# Patient Record
Sex: FEMALE | Race: WHITE
[De-identification: names, ages, dates, MRNs, and addresses within clinical notes are randomized per-mention and may not be internally consistent; named-entity substitution may affect disease eponyms.]

---

## 2018-04-20 ENCOUNTER — HOSPITAL ENCOUNTER (INPATIENT)
Dept: HOSPITAL 26 - MED | Age: 23
LOS: 1 days | Discharge: LEFT BEFORE DISCHARGE | DRG: 872 | End: 2018-04-21
Payer: COMMERCIAL

## 2018-04-20 VITALS — HEIGHT: 65 IN | WEIGHT: 123 LBS | BODY MASS INDEX: 20.49 KG/M2

## 2018-04-20 DIAGNOSIS — N12: ICD-10-CM

## 2018-04-20 DIAGNOSIS — I10: ICD-10-CM

## 2018-04-20 DIAGNOSIS — Z53.21: ICD-10-CM

## 2018-04-20 DIAGNOSIS — Z90.49: ICD-10-CM

## 2018-04-20 DIAGNOSIS — K59.00: ICD-10-CM

## 2018-04-20 DIAGNOSIS — A41.9: Primary | ICD-10-CM

## 2018-04-20 DIAGNOSIS — Z87.891: ICD-10-CM

## 2018-04-20 DIAGNOSIS — Z88.5: ICD-10-CM

## 2018-04-21 VITALS — SYSTOLIC BLOOD PRESSURE: 116 MMHG | DIASTOLIC BLOOD PRESSURE: 65 MMHG

## 2018-04-21 VITALS — SYSTOLIC BLOOD PRESSURE: 112 MMHG | DIASTOLIC BLOOD PRESSURE: 69 MMHG

## 2018-04-21 VITALS — DIASTOLIC BLOOD PRESSURE: 68 MMHG | SYSTOLIC BLOOD PRESSURE: 116 MMHG

## 2018-04-21 LAB
ALBUMIN FLD-MCNC: 3.6 G/DL (ref 3.4–5)
ANION GAP SERPL CALCULATED.3IONS-SCNC: 10.4 MMOL/L (ref 8–16)
APPEARANCE UR: (no result)
AST SERPL-CCNC: 15 U/L (ref 15–37)
BARBITURATES UR QL SCN: (no result) NG/ML
BENZODIAZ UR QL SCN: (no result) NG/ML
BILIRUB SERPL-MCNC: 0.4 MG/DL (ref 0–1)
BILIRUB UR QL STRIP: NEGATIVE
BUN SERPL-MCNC: 12 MG/DL (ref 7–18)
BZE UR QL SCN: (no result) NG/ML
CANNABINOIDS UR QL SCN: (no result) NG/ML
CHLORIDE SERPL-SCNC: 100 MMOL/L (ref 98–107)
CO2 SERPL-SCNC: 29.1 MMOL/L (ref 21–32)
COLOR UR: YELLOW
CREAT SERPL-MCNC: 0.7 MG/DL (ref 0.6–1.3)
EOSINOPHIL NFR BLD MANUAL: 1 % (ref 0–4)
ERYTHROCYTE [DISTWIDTH] IN BLOOD BY AUTOMATED COUNT: 13.3 % (ref 11.6–13.7)
GFR SERPL CREATININE-BSD FRML MDRD: 135 ML/MIN (ref 90–?)
GLUCOSE SERPL-MCNC: 101 MG/DL (ref 74–106)
GLUCOSE UR STRIP-MCNC: NEGATIVE MG/DL
HCT VFR BLD AUTO: 35.6 % (ref 36–48)
HGB BLD-MCNC: 11.8 G/DL (ref 12–16)
HGB UR QL STRIP: (no result)
LEUKOCYTE ESTERASE UR QL STRIP: (no result)
LYMPHOCYTES NFR BLD MANUAL: 13 % (ref 20–46)
MCH RBC QN AUTO: 31 PG (ref 27–31)
MCHC RBC AUTO-ENTMCNC: 33 G/DL (ref 33–37)
MCV RBC AUTO: 91.9 FL (ref 80–94)
MONOCYTES NFR BLD MANUAL: 6 % (ref 5–12)
NITRITE UR QL STRIP: POSITIVE
OPIATES UR QL SCN: (no result) NG/ML
PCP UR QL SCN: (no result) NG/ML
PH UR STRIP: 7.5 [PH] (ref 5–9)
PLATELET # BLD AUTO: 211 K/UL (ref 140–450)
POTASSIUM SERPL-SCNC: 3.5 MMOL/L (ref 3.5–5.1)
RBC # BLD AUTO: 3.87 MIL/UL (ref 4.2–5.4)
RBC #/AREA URNS HPF: (no result) /HPF (ref 0–5)
SODIUM SERPL-SCNC: 136 MMOL/L (ref 136–145)
WBC # BLD AUTO: 11 K/UL (ref 4.8–10.8)
WBC,URINE: (no result) /HPF (ref 0–5)

## 2018-04-21 NOTE — NUR
PT REQUESTING ALL IV ANTIBIOTICS BE GIVEN NOW, I EXPLAINED TO PT I CANNOT DO THAT AND IT IS 
NOT SAFE. PT STATES, "I DON'T UNDERSTAND WHY YOU CAN'T JUST GIVE ME ALL OF THEM, I DON'T 
WANT TO WAIT." I REINFORCED TEACHING TO PT, PT STATES SHE WILL TALK TO DR ABOUT BEING GIVEN 
ORAL ANTIBIOTICS.

## 2018-04-21 NOTE — NUR
DR WRIGHT IS AWARE THAT PATIENT WANTS TO GO AMA. HE SAID IT WAS UP TO HER TO LEAVE AGAINST 
AMA, AND SHE WAS EXPLAINED THE RISKS OF AMA.

## 2018-04-21 NOTE — NUR
TALKED WITH PT AND EXPLAINED RISKS OF LEAVING, EXPLAINED THE IMPORTANCE OF STAYING, PT 
AGREES TO STAY

## 2018-04-21 NOTE — NUR
PATIENT HAS BEEN SCREENED AND CATEGORIZED AS LOW RISK. PATIENT WILL BE SEEN WITHIN 7 DAYS OF 
ADMISSION. 

4/28/18

[address], [facility]

## 2018-04-21 NOTE — NUR
ROSENDO ASKED BOYFRIEND TO LEAVE BECAUSE THEIR IS A FEMALE ROOMMATE IN THE ROOM AND BOYFRIEND 
REFUSED TO ACKNOWLEDGE OR LOOK AT US

## 2018-04-21 NOTE — NUR
PT FOUND IN HALLWAY STATING SHE WANTS TO LEAVE AND SIGN OUT AMA, BOYFRIEND STATES SHE IS 
REALLY AGITATED AND DOES NOT WANT TO BE HERE, PT ADMITS TO DOING METH RECENTLY.

## 2018-04-21 NOTE — NUR
ADMINISTERED MORNING MED. PATIENT TOLERATED WELL. PATIENT COMPLAINS OF WANTING TO GO HOME. 
EXPLAINED THE RISKS OF GOING AMA. PATIENT VERBALIZED UNDERSTANDING. SHE WILL CALL ME USING 
THE CALL LIGHT IF SHE DECIDES TO GO AMA. WILL CONTINUE TO MONITOR THE PATIENT. BOYFRIEND AT 
BEDSIDE.

## 2018-04-21 NOTE — NUR
RECEIVED BEDSIDE REPORT FROM NIGHT SHIFT NURSE. PATIENT IS AWAKE, ALERT AND ORIENTEDX4. 
BOYFRIEND AT BEDSIDE. MS PATIENT. NO SIGNS OF DISTRESS ON ROOM AIR. STANDARD PRECAUTIONS. IV 
ON R AC 20G INFUSING D5%/NACL 0.9% AT 100ML/HR. IV SITE IS CLEAN, DRY AND INTACT. SKIN IS 
INTACT. PATIENT IS ABLE TO AMBULATE TO THE RESTROOM. PATIENT DX UTI. NO COMPLAINS OF PAIN AT 
THIS TIME. BED IN LOW POSITION. CALL LIGHT WITHIN REACH. WILL CONTINUE TO MONITOR THE 
PATIENT.

## 2018-04-21 NOTE — NUR
PT REQUESTS TO BE DISCONNECTED FROM IV TO USE THE BATHROOM AND STATES, "I DID NOT GET MY 
PYRIDIUM YET, WHERE IS IT?" EXPLAINED TO PT I GAVE IT TO HER ALREADY AND EXPLAINED 
MEDICATION AND SIDE EFFECTS, PT VERBALIZED UNDERSTANDING

## 2018-04-21 NOTE — NUR
PT STATING, "I DON'T UNDERSTAND WHY I HAVE TO BE HERE, I DON'T WANT TO BE HERE, WHY IS THE 
TV ON ITS SCARY, I DON'T UNDERSTAND THIS PLACE."

## 2018-04-21 NOTE — NUR
PATIENT PRESENTS TO ED WITH LEFT FLANK PAIN, SUPRAPUBIC PAIN, AND BURNING WITH 
URINATION 7/10, WITH FREQUENCY AND URGENCY X1 WK. PT IS FEBRILE, TEMP 100.4, 
AND TACKY  . PT STATES NAUESEA WITHOUT VOMITING; SKIN IS PINK/HOT/DRY; 
AAOX4 WITH EVEN AND STEADY GAIT; LUNGS CLEAR BL; HR EVEN AND REGULAR; PT DENIES 
ANY, CP, SOB, OR COUGH AT THIS TIME; PATIENT STATES PAIN OF 7/10 AT THIS TIME; 
VSS; PATIENT POSITIONED FOR COMFORT; COOLING MEASURES IMPLEMENTED; HOB 
ELEVATED; BEDRAILS UP X1; BED DOWN. ER MD MADE AWARE OF PT STATUS. CONTINUE TO 
MONITOR.

## 2018-04-21 NOTE — NUR
PT ARRIVED ON THE UNIT FROM ER IN STABLE CONDITION. REPORT RECEIVED FROM SUNNY IZAGUIRRE. NO S/S OF 
DISTRESS NOTED. PT AAOX4, ON ROOM AIR. IV TO R AC PATENT AND INTACT. SKIN INTACT, WARM AND 
DRY TO TOUCH. LUNGS CLEAR BILAT. BOWEL SOUNDS PRESENT. INITIAL ASSESSMENT COMPLETED, PLAN OF 
CARE DISCUSSED WITH PT, ALL SAFETY PRECAUTIONS MET, CALL LIGHT WITHIN REACH, WILL CONTINUE 
TO MONITOR

## 2018-04-21 NOTE — NUR
MICHAEL WRIGHT FOR ORDERS FOR PYRIDIUM, WILL CARRY OUT NEW ORDERS

-------------------------------------------------------------------------------

Addendum: 04/21/18 at 0559 by Shantelle Carney RN

-------------------------------------------------------------------------------

TIME CORRECTION FOR 0400 AM

## 2018-04-25 NOTE — NUR
PER ELIZABET , , NO REVIEW NEEDED    SHE SAID TO FAX REVIEW TO [facility] 
[phone]    PHONE [phone].    FAXED ER REPORT AND H&P.